# Patient Record
Sex: MALE | Race: WHITE | NOT HISPANIC OR LATINO | URBAN - METROPOLITAN AREA
[De-identification: names, ages, dates, MRNs, and addresses within clinical notes are randomized per-mention and may not be internally consistent; named-entity substitution may affect disease eponyms.]

---

## 2020-06-01 ENCOUNTER — OUTPATIENT (OUTPATIENT)
Dept: OUTPATIENT SERVICES | Facility: HOSPITAL | Age: 35
LOS: 1 days | End: 2020-06-01
Payer: COMMERCIAL

## 2020-06-01 DIAGNOSIS — Z11.59 ENCOUNTER FOR SCREENING FOR OTHER VIRAL DISEASES: ICD-10-CM

## 2020-06-01 LAB — SARS-COV-2 RNA SPEC QL NAA+PROBE: SIGNIFICANT CHANGE UP

## 2020-06-01 PROCEDURE — U0003: CPT

## 2022-02-27 ENCOUNTER — EMERGENCY (EMERGENCY)
Facility: HOSPITAL | Age: 37
LOS: 1 days | Discharge: HOME | End: 2022-02-27
Attending: EMERGENCY MEDICINE | Admitting: EMERGENCY MEDICINE
Payer: COMMERCIAL

## 2022-02-27 VITALS
HEART RATE: 67 BPM | HEIGHT: 71 IN | DIASTOLIC BLOOD PRESSURE: 84 MMHG | WEIGHT: 229.94 LBS | SYSTOLIC BLOOD PRESSURE: 168 MMHG | TEMPERATURE: 98 F | OXYGEN SATURATION: 95 % | RESPIRATION RATE: 18 BRPM

## 2022-02-27 PROCEDURE — 73130 X-RAY EXAM OF HAND: CPT | Mod: 26,LT

## 2022-02-27 PROCEDURE — 73130 X-RAY EXAM OF HAND: CPT

## 2022-02-27 PROCEDURE — 99283 EMERGENCY DEPT VISIT LOW MDM: CPT

## 2022-02-27 PROCEDURE — 99283 EMERGENCY DEPT VISIT LOW MDM: CPT | Mod: 25

## 2022-02-27 RX ORDER — IBUPROFEN 200 MG
600 TABLET ORAL ONCE
Refills: 0 | Status: COMPLETED | OUTPATIENT
Start: 2022-02-27 | End: 2022-02-27

## 2022-02-27 RX ORDER — BACITRACIN ZINC 500 UNIT/G
1 OINTMENT IN PACKET (EA) TOPICAL ONCE
Refills: 0 | Status: COMPLETED | OUTPATIENT
Start: 2022-02-27 | End: 2022-02-27

## 2022-02-27 RX ADMIN — Medication 1 APPLICATION(S): at 21:20

## 2022-02-27 RX ADMIN — Medication 600 MILLIGRAM(S): at 21:21

## 2022-02-27 NOTE — ED PROVIDER NOTE - ATTENDING CONTRIBUTION TO CARE
pt is a 38yo male with no significant pmhx presents with left hand pain. pt reports his son closed a door on his left hand 4/5 digits. pt reports pain and bleeding. pt did not take anything for symptoms. tetanus utd. pt is right handed.  pt on exam with left hand nt, ttp over distal phalynx of 3-5th digits, pt with abrasion and small subungual hematomas. too small to drain,  wounds dressed,  xrays neg.  d/c fu hand if pain persists

## 2022-02-27 NOTE — ED ADULT NURSE NOTE - OBJECTIVE STATEMENT
Pt comes from triage. Pt reports left hand was slammed in door, has bruising under nail of 4th finger, abrasions to 5th and 3rd finger. Pt breathing easy, unlabored, no signs of distress. Pt able to move all fingers.

## 2022-02-27 NOTE — ED PROVIDER NOTE - NSFOLLOWUPINSTRUCTIONS_ED_ALL_ED_FT
1) Follow-up with Orthopedics, See referred doctor. Call today / next business day for close, prompt follow-up.  2) Return to Emergency room for any worsening or persistent pain, weakness, numbness, fever, color change to extremity, or any other concerning symptoms.  3) See attached instruction sheets for additional information, including information regarding signs and symptoms to look out for, reasons to seek immediate care and other important instructions.    apply ice daily  take ibuprofen 600mg every 6 hours for pain  apply bacitracin to abrasion daily.                    FINGER SPRAIN - AfterCare(R) Instructions(ER/ED)           Finger Sprain    WHAT YOU NEED TO KNOW:    A finger sprain happens when ligaments in your finger or thumb are stretched or torn. Ligaments are the tough tissues that connect bones. Ligaments allow your hands to grasp and pinch.    DISCHARGE INSTRUCTIONS:    Return to the emergency department if:   •The skin on your injured finger looks bluish or pale (less color than normal).      •You have new weakness or numbness in your finger or thumb. It may tingle or burn.      •You have a splint that you cannot adjust and it feels too tight.      Call your doctor if:   •You have new or increased swelling or pain in your finger.      •You have new or increased stiffness when you move your injured finger.      •You have questions or concerns about your injury or treatment.      Medicines:   •Prescription pain medicine may be given. Ask your healthcare provider how to take this medicine safely. Some prescription pain medicines contain acetaminophen. Do not take other medicines that contain acetaminophen without talking to your healthcare provider. Too much acetaminophen may cause liver damage. Prescription pain medicine may cause constipation. Ask your healthcare provider how to prevent or treat constipation.       •Take your medicine as directed. Contact your healthcare provider if you think your medicine is not helping or if you have side effects. Tell him or her if you are allergic to any medicine. Keep a list of the medicines, vitamins, and herbs you take. Include the amounts, and when and why you take them. Bring the list or the pill bottles to follow-up visits. Carry your medicine list with you in case of an emergency.      Care for your finger:   •Rest your finger for at least 48 hours. Do not do activities that cause pain. Return to normal activities as directed.      •Apply ice on your finger to help decrease pain and swelling. Use an ice pack, or put crushed ice in a plastic bag. Cover the bag with a towel before you place it on your finger. Apply ice every hour for 15 to 20 minutes at a time. You may need to apply ice at least 4 to 8 times each day. Continue for as many days as directed.      •Elevate (raise) your finger above the level of your heart as often as you can. This will help decrease swelling and pain. You can elevate your hand by resting it on a pillow.             •Use a splint or compression as directed. Compression (tight hold) helps support your finger or thumb as it heals. Tape your injured finger to the finger beside it. Severe sprains may be treated with a splint. A splint prevents your finger from moving while it heals. Ask how long you must wear the splint or tape, and how to apply them.      •Do exercises as directed. You may be given gentle exercises to begin in a few days. Exercises can help decrease stiffness in your finger or thumb. Exercises also help decrease pain and swelling and improve the movement of your finger or thumb. Check with your healthcare provider before you return to your normal activities or sports.      Follow up with your doctor as directed: Write down any questions you may have to ask at your follow up visits.       © Copyright [employer] 2022           back to top                          © Copyright [employer] 2022

## 2022-02-27 NOTE — ED PROVIDER NOTE - CARE PROVIDER_API CALL
William Jaramillo)  Orthopaedic Surgery Surgery  [address] [place] [address]  Phone: [phone]  Fax: [phone]  Follow Up Time: Routine

## 2022-02-27 NOTE — ED PROVIDER NOTE - PHYSICAL EXAMINATION
ms left ue:= left hand 4th digit subungual hematoma with distal tenderness. from sensation intact 5th digit abrasion dorsum near nailbed ttp from sensation intact rest of extremity nontender nvi

## 2022-02-27 NOTE — ED PROVIDER NOTE - PATIENT PORTAL LINK FT
You can access the FollowMyHealth Patient Portal offered by [facility] by registering at the following website: [email or web address]. By joining [employer]’s FollowMyHealth portal, you will also be able to view your health information using other applications (apps) compatible with our system.

## 2022-02-27 NOTE — ED PROVIDER NOTE - PROGRESS NOTE DETAILS
no fx on acute read. pt advised on possible findings on final x ray read. pt advised hand follow up. supportive tx and wound care. will dc

## 2022-02-27 NOTE — ED PROVIDER NOTE - OBJECTIVE STATEMENT
pt is a 38yo male with no significant pmhx presents with left hand pain. pt reports his son closed a door on his left hand 4/5 digits. pt reports pain and bleeding. pt did not take anything for symptoms. tetanus utd. pt is right handed. denies decreased rom.

## 2024-11-07 ENCOUNTER — EMERGENCY (EMERGENCY)
Facility: HOSPITAL | Age: 39
LOS: 1 days | Discharge: HOME | End: 2024-11-07
Attending: EMERGENCY MEDICINE | Admitting: EMERGENCY MEDICINE
Payer: COMMERCIAL

## 2024-11-07 VITALS
RESPIRATION RATE: 16 BRPM | OXYGEN SATURATION: 95 % | HEART RATE: 89 BPM | DIASTOLIC BLOOD PRESSURE: 85 MMHG | SYSTOLIC BLOOD PRESSURE: 137 MMHG | WEIGHT: 244.71 LBS | TEMPERATURE: 98 F | HEIGHT: 71 IN

## 2024-11-07 PROCEDURE — 99284 EMERGENCY DEPT VISIT MOD MDM: CPT

## 2024-11-07 NOTE — ED ADULT TRIAGE NOTE - CHIEF COMPLAINT QUOTE
I got a fish hook caught in my R palm.   Pt fishing tonight got fish hook in R palm just proximal to 1st digit. Hook is fully through skin causing 2 puncture wounds. Unknown last tetanus shot.

## 2024-11-08 VITALS
OXYGEN SATURATION: 97 % | DIASTOLIC BLOOD PRESSURE: 80 MMHG | RESPIRATION RATE: 18 BRPM | SYSTOLIC BLOOD PRESSURE: 135 MMHG | TEMPERATURE: 98 F | HEART RATE: 80 BPM

## 2024-11-08 PROBLEM — Z78.9 OTHER SPECIFIED HEALTH STATUS: Chronic | Status: ACTIVE | Noted: 2022-02-27

## 2024-11-08 PROCEDURE — 73130 X-RAY EXAM OF HAND: CPT | Mod: 26,RT

## 2024-11-08 PROCEDURE — 73130 X-RAY EXAM OF HAND: CPT

## 2024-11-08 PROCEDURE — 90471 IMMUNIZATION ADMIN: CPT

## 2024-11-08 PROCEDURE — 90715 TDAP VACCINE 7 YRS/> IM: CPT

## 2024-11-08 PROCEDURE — 99283 EMERGENCY DEPT VISIT LOW MDM: CPT | Mod: 25

## 2024-11-08 RX ORDER — DOXYCYCLINE HYCLATE 100 MG/1
1 TABLET, FILM COATED ORAL
Qty: 14 | Refills: 0
Start: 2024-11-08 | End: 2024-11-14

## 2024-11-08 RX ORDER — DOXYCYCLINE HYCLATE 100 MG/1
100 TABLET, FILM COATED ORAL ONCE
Refills: 0 | Status: COMPLETED | OUTPATIENT
Start: 2024-11-08 | End: 2024-11-08

## 2024-11-08 RX ORDER — FIRST AID ANTIBIOTIC 500 [USP'U]/G
1 OINTMENT TOPICAL ONCE
Refills: 0 | Status: COMPLETED | OUTPATIENT
Start: 2024-11-08 | End: 2024-11-08

## 2024-11-08 RX ORDER — CLOSTRIDIUM TETANI TOXOID ANTIGEN (FORMALDEHYDE INACTIVATED), CORYNEBACTERIUM DIPHTHERIAE TOXOID ANTIGEN (FORMALDEHYDE INACTIVATED), BORDETELLA PERTUSSIS TOXOID ANTIGEN (GLUTARALDEHYDE INACTIVATED), BORDETELLA PERTUSSIS FILAMENTOUS HEMAGGLUTININ ANTIGEN (FORMALDEHYDE INACTIVATED), BORDETELLA PERTUSSIS PERTACTIN ANTIGEN, AND BORDETELLA PERTUSSIS FIMBRIAE 2/3 ANTIGEN 5; 2; 2.5; 5; 3; 5 [LF]/.5ML; [LF]/.5ML; UG/.5ML; UG/.5ML; UG/.5ML; UG/.5ML
0.5 INJECTION, SUSPENSION INTRAMUSCULAR ONCE
Refills: 0 | Status: COMPLETED | OUTPATIENT
Start: 2024-11-08 | End: 2024-11-08

## 2024-11-08 RX ORDER — CIPROFLOXACIN LACTATE 200MG/20ML
500 VIAL (ML) INTRAVENOUS ONCE
Refills: 0 | Status: COMPLETED | OUTPATIENT
Start: 2024-11-08 | End: 2024-11-08

## 2024-11-08 RX ORDER — CIPROFLOXACIN LACTATE 200MG/20ML
1 VIAL (ML) INTRAVENOUS
Qty: 14 | Refills: 0
Start: 2024-11-08 | End: 2024-11-14

## 2024-11-08 RX ADMIN — Medication 500 MILLIGRAM(S): at 00:36

## 2024-11-08 RX ADMIN — DOXYCYCLINE HYCLATE 100 MILLIGRAM(S): 100 TABLET, FILM COATED ORAL at 00:36

## 2024-11-08 RX ADMIN — FIRST AID ANTIBIOTIC 1 APPLICATION(S): 500 OINTMENT TOPICAL at 00:36

## 2024-11-08 RX ADMIN — CLOSTRIDIUM TETANI TOXOID ANTIGEN (FORMALDEHYDE INACTIVATED), CORYNEBACTERIUM DIPHTHERIAE TOXOID ANTIGEN (FORMALDEHYDE INACTIVATED), BORDETELLA PERTUSSIS TOXOID ANTIGEN (GLUTARALDEHYDE INACTIVATED), BORDETELLA PERTUSSIS FILAMENTOUS HEMAGGLUTININ ANTIGEN (FORMALDEHYDE INACTIVATED), BORDETELLA PERTUSSIS PERTACTIN ANTIGEN, AND BORDETELLA PERTUSSIS FIMBRIAE 2/3 ANTIGEN 0.5 MILLILITER(S): 5; 2; 2.5; 5; 3; 5 INJECTION, SUSPENSION INTRAMUSCULAR at 00:36

## 2024-11-08 NOTE — ED ADULT NURSE NOTE - OBJECTIVE STATEMENT
Pt arrived to ED c/o fish hook stuck in R hand. Hook is noted through skin resulting in 2 puncture wounds. Pt states hook was not clean and does not know when his last tetanus shot was. Denies any other complaints or issues. Provider aware. Pt in bed comfortably awaiting provider. Bed in lowest position, safety maintained, nursing care ongoing.

## 2024-11-08 NOTE — ED PROVIDER NOTE - CLINICAL SUMMARY MEDICAL DECISION MAKING FREE TEXT BOX
39-year-old male status post right hand fishhook injury tonight while fishing [place] [employer].  Unable to remove by himself here for removal.  Tetanus not up-to-date.    right hand fish hook removal, tdap, PO abx

## 2024-11-08 NOTE — ED PROVIDER NOTE - NSFOLLOWUPINSTRUCTIONS_ED_ALL_ED_FT
1) Follow-up with your Primary Medical Doctor or referred doctor. Call today / next business day for prompt follow-up.  2) Return to Emergency room for any worsening or persistent pain, weakness, fever, or any other concerning symptoms.  3) See attached instruction sheets for additional information, including information regarding signs and symptoms to look out for, reasons to seek immediate care and other important instructions.  4) Follow-up with any specialists as discussed / noted as well.   5) Take doxycycline twice a day for 7 days  6) Cipro twice a day for 7 days.      Fishhooks have a pointed tip, also called a olman, which makes the hook difficult to remove. Sometimes, fishhooks accidentally puncture the skin. This type of injury happens most commonly in the face, scalp, ears, or back. Fishhooks that puncture the skin often need to be removed by a health care provider. The method of removal depends on the type and location of your injury. You may have specific home care instructions if:  The fishhook injury was in or near your eye.  The injury required stitches (sutures).  The injury became infected.  If the fishhook was not removed by a medical professional, call your health care provider. You may need an exam or a tetanus shot.    Follow these instructions at home:  Medicines    If you were prescribed an antibiotic medicine or ointment, use it as told by your health care provider. Do not stop using the antibiotic even if you start to feel better.  Take over-the-counter and prescription medicines only as told by your health care provider.  Bathing    Keep the dressing dry as told by your health care provider.  Do not take baths, swim, or use a hot tub until your health care provider approves. Ask your health care provider if you may take showers.  Wound care    A normal puncture site compared to an infected puncture site. The infected puncture site has swelling and redness.  Follow instructions from your health care provider about how to take care of your puncture wound. If you have a bandage (dressing), make sure you:  Wash your hands with soap and water for at least 20 seconds before and after you change your dressing. If soap and water are not available, use hand .  Change your dressing as told by your health care provider.  Leave sutures, skin glue, or adhesive strips in place. These skin closures may need to stay in place for 2 weeks or longer. If adhesive strip edges start to loosen and curl up, you may trim the loose edges. Do not remove adhesive strips completely unless your health care provider tells you to do that.  Check your puncture area every day for signs of infection. Check for:  Redness, swelling, or pain.  Fluid or blood.  Warmth.  Pus or a bad smell.  General instructions    Return to your normal activities as told by your health care provider. Ask your health care provider what activities are safe for you.  Keep all follow-up visits. This is important.  Contact a health care provider if:  You have chills or a fever.  You have more pain at the puncture site.  You have any of these signs of infection:  Redness or swelling around your wound.  Fluid or blood coming from your wound.  Warmth coming from your wound.  Pus or a bad smell coming from your wound.  Get help right away if:  You have signs of infection and a red streak going out from your puncture site.  Summary  Fishhooks have a pointed tip, also called a olman, which makes the hook difficult to remove.  Sometimes, fishhooks accidentally puncture the skin of the face, scalp, ears, or back.  Fishhooks that puncture the skin often need to be removed by a health care provider.  If the fishhook was not removed by a medical professional, call your health care provider. You may need an exam or a tetanus shot.  Follow instructions from your health care provider about how to take care of your puncture wound. Call your health care provider if you have any signs of infection.  This information is not intended to replace advice given to you by your health care provider. Make sure you discuss any questions you have with your health care provider.

## 2024-11-08 NOTE — ED PROVIDER NOTE - PHYSICAL EXAMINATION
Gen: Alert, NAD  Head/eyes: NC/AT, PERRL  ENT: airway patent  Neck: supple  Pulm/lung: Bilateral clear BS  CV/heart: RRR  GI/Abd: soft, NT/ND, +BS, no guarding/rebound tenderness  Musculoskeletal: no edema/erythema/cyanosis  Skin: no rash, right thenar eminence +fish hook 2 puncture wound (entry and exit)  Neuro: AAOx3, grossly intact

## 2024-11-08 NOTE — ED PROVIDER NOTE - PATIENT PORTAL LINK FT
You can access the FollowMyHealth Patient Portal offered by [facility] by registering at the following website: [email or web address]. By joining [email or web address]’s FollowMyHealth portal, you will also be able to view your health information using other applications (apps) compatible with our system.

## 2024-11-08 NOTE — ED PROVIDER NOTE - OBJECTIVE STATEMENT
39-year-old male status post right hand fishhook injury tonight while fishing at [place].  Unable to remove by himself here for removal.  Tetanus not up-to-date.